# Patient Record
Sex: FEMALE | Race: WHITE | Employment: OTHER | URBAN - METROPOLITAN AREA
[De-identification: names, ages, dates, MRNs, and addresses within clinical notes are randomized per-mention and may not be internally consistent; named-entity substitution may affect disease eponyms.]

---

## 2017-01-30 ENCOUNTER — HOSPITAL ENCOUNTER (EMERGENCY)
Facility: HOSPITAL | Age: 62
Discharge: HOME | End: 2017-01-30
Attending: EMERGENCY MEDICINE
Payer: MEDICARE

## 2017-01-30 VITALS
TEMPERATURE: 98 F | WEIGHT: 156 LBS | RESPIRATION RATE: 16 BRPM | OXYGEN SATURATION: 99 % | HEART RATE: 88 BPM | DIASTOLIC BLOOD PRESSURE: 80 MMHG | HEIGHT: 66 IN | BODY MASS INDEX: 25.07 KG/M2 | SYSTOLIC BLOOD PRESSURE: 134 MMHG

## 2017-01-30 DIAGNOSIS — I83.90 VARICOSE VEIN OF LEG: Primary | ICD-10-CM

## 2017-01-30 PROCEDURE — 99282 EMERGENCY DEPT VISIT SF MDM: CPT

## 2017-01-30 RX ORDER — ASPIRIN 81 MG/1
81 TABLET, CHEWABLE ORAL DAILY
COMMUNITY

## 2017-01-30 RX ORDER — BUPROPION HYDROCHLORIDE 100 MG/1
100 TABLET ORAL DAILY
COMMUNITY

## 2017-01-30 RX ORDER — HYDROCODONE BITARTRATE AND ACETAMINOPHEN 5; 325 MG/1; MG/1
1 TABLET ORAL EVERY 6 HOURS PRN
COMMUNITY

## 2017-01-30 RX ORDER — BUPRENORPHINE HCL 8 MG/1
1 TABLET SUBLINGUAL DAILY
COMMUNITY

## 2017-01-30 NOTE — ED INITIAL ASSESSMENT (HPI)
Patient states she has a vericous vein on the right lower leg that was bleeding 2 nights ago. Patient states it bled for about 20 minutes, then stopped, has not bled since.   Patient states she is traveling via airplane on 1/13, and just wants to get it ch

## 2018-10-23 NOTE — ED PROVIDER NOTES
Patient Seen in: [facility] Emergency Department    History   Patient presents with:  Varicose Veins (cardiovascular)    Stated Complaint: Varicose Vein    HPI    Patient is a 64-year-old female comes in emergency room for evaluation of varicose vei Patient feeling much better, ready to go home. I have reviewed discharge instructions with the patient. The patient verbalized understanding. Patient armband removed and shredded BMI 25.19 kg/m2  SpO2 99%        Physical Exam    Constitutional: Well-appearing in no acute distress  Musculoskeletal: Patient has a small, punctate varicosity distal portion of right lateral shin. There is no active bleeding at this time.   There is scab

## (undated) NOTE — ED AVS SNAPSHOT
[facility] Emergency Department    [place]  [address] [address]    Phone:  [phone]    Fax:  [phone]           Kimberlee Fernandes   MRN: [id number]    Department:  [facility] Emergency Department   Date of Visit:  1/30/2 IF THERE IS ANY CHANGE OR WORSENING OF YOUR CONDITION, CALL YOUR PRIMARY CARE PHYSICIAN AT ONCE OR RETURN IMMEDIATELY TO THE EMERGENCY DEPARTMENT.     If you have been prescribed any medication(s), please fill your prescription right away and begin taking t

## (undated) NOTE — ED AVS SNAPSHOT
[facility] Emergency Department    [place]  [address] [address]    Phone:  [phone]    Fax:  [phone]           Hilario Womack   MRN: [id number]    Department:  [facility] Emergency Department   Date of Visit:  1/30/2 self-assessment the day after your visit. You may also receive a call from our patient liason soon after your visit. Also, some patients receive a detailed feedback survey mailed to them a week after the visit.   If you receive this, we would really apprec [facility] [address] [facility] [id number] [address] ([address]) [facility] [address] [facility]. ([address]) [facility] If you have questions, you can call [phone] to talk to our [facility] Staff. Remember, [employer] is NOT to be used for urgent needs. For medical emergencies, dial 911.